# Patient Record
(demographics unavailable — no encounter records)

---

## 2024-10-13 NOTE — HISTORY OF PRESENT ILLNESS
[de-identified] : 53F, RHD, PMHX of Thyroid Disease, Mood Swings, Chronic Back Pain presents with bialteral wrist pain and swelling. Reports has been on going for many years - progressively getting worse. Starting to interfere with ADLs. Denies numbness/tingling in hands. Denies outside imaging/treatment.   1/20/23: f/u bilateral thumb pain. Reports right is doing well after CSI on 12/16/22. Reports left is still having a lot of pain, but injection did help a great deal nonetheless. Denies numbness/tingling.  5/19/23: f/u bilateral thumb pain. Reports using old bowling glove which does help - never purchased metagrip. Admits to having a lot of pain, significantly worse than last time. Denies numbness/tingling   8/29/23: f/u Left wrists. Patient reports to still having a lot of pain. No numbness. NO OT/PT.  10/23/23: f/u left wrist. Patient reports consistent pain in her left wrist, 10/10 with exertion and an 8/10 at rest. Patient reports taking Gabapentin for her symptoms with no relief. Denies numbness/tingling.   11/27/23: s/p left thumb CMC joint arthroplasty with trapeziectomy, left abductor pollicus longus to flexor carpi radialis tendon transfer [11/15/23]. Patient reports intermittent pain on the surgery site associated with discomfort and pressure. Patient has been taking gabapentin and suboxone, reporting some relief. Denies fevers, chills, SOB. Denies numbness/tingling.   01/03/24: s/p left thumb CMC joint arthroplasty with trapeziectomy, left abductor pollicus longus to flexor carpi radialis tendon transfer [11/15/23]. Patient reports that she is doing well. Patient is currently asymptomatic with no acute complaints. Reports some numbness over the left thumb, but reports it is much better than her previous visit.   02/12/24: s/p left thumb CMC joint arthroplasty with trapeziectomy, left abductor pollicus longus to flexor carpi radialis tendon transfer [11/15/23]. Patient reports no pain or discomfort at this time. Patient reports some numbness with palpation, but very minimal. Denies numbness.   03/22/24: f/u bilateral thumbs.  s/p left thumb CMC joint arthroplasty with trapeziectomy, left abductor pollicus longus to flexor carpi radialis tendon transfer [11/15/23]. Patient reports that her right wrist and thumb have been bothering her tremendously, prompting her to return. Patient reports her left thumb is doing well and reports no issues or symptoms. Patient reports her last CSI was in 05/19/23 and provided relief. Patient would like another CSI today.   8/13/24:  Left thumb follow up. Patient reports symptoms have worsened, unable to make a fist and/or grasp. Patient wishes to discuss surgery.  8/20/24: Here to review EMG test results.   09/16/2024: Patient here s/p right wrist proximal row corpectomy, right radial carpal interposition arthroplasty, right wrist posterior interosseous nerve neurectomy, right wrist radial styloidectomy, right ring finger extensor digitorum tendon transfer to middle finger to extensor digitorum communis tendon, right index finger extensor digitorum communis tendon secondary repair with the use of an autograft from extensor indices proprius tendon, right wrist radial extensor tenosynovectomy with transposition of the extensor retinaculum deep to the extensor tendon, and right wrist volar plaster splint application on 09/05/2024. Patient reports she took 2 doses of oxycodone after surgery with no relief. Patient reports she feels the same symptom she had prior to surgery of not being able to lift her fingers of her right hand. Patient reports when she tries to lift her fingers, she experiences pain. Patient denies fever, chills, SOB, CP  10/03/2024: Patient here s/p right wrist proximal row corpectomy, right radial carpal interposition arthroplasty, right wrist posterior interosseous nerve neurectomy, right wrist radial styloidectomy, right ring finger extensor digitorum tendon transfer to middle finger to extensor digitorum communis tendon, right index finger extensor digitorum communis tendon secondary repair with the use of an autograft from extensor indices proprius tendon, right wrist radial extensor tenosynovectomy with transposition of the extensor retinaculum deep to the extensor tendon, and right wrist volar plaster splint application on 09/05/2024. Patient reports she is not in pain at this time. However, patient reports she is still experiencing difficulty with lifting her fingers. Patient reports she is currently in OT 2x a week with only minor improvements. Patient reports she is wearing the wrist brace provided last visit. Patient reports if she does not wear the brace, she experiences numbness in her fingers.  Patient denies taking any medication for pain.

## 2024-10-13 NOTE — HISTORY OF PRESENT ILLNESS
[de-identified] : 53F, RHD, PMHX of Thyroid Disease, Mood Swings, Chronic Back Pain presents with bialteral wrist pain and swelling. Reports has been on going for many years - progressively getting worse. Starting to interfere with ADLs. Denies numbness/tingling in hands. Denies outside imaging/treatment.   1/20/23: f/u bilateral thumb pain. Reports right is doing well after CSI on 12/16/22. Reports left is still having a lot of pain, but injection did help a great deal nonetheless. Denies numbness/tingling.  5/19/23: f/u bilateral thumb pain. Reports using old bowling glove which does help - never purchased metagrip. Admits to having a lot of pain, significantly worse than last time. Denies numbness/tingling   8/29/23: f/u Left wrists. Patient reports to still having a lot of pain. No numbness. NO OT/PT.  10/23/23: f/u left wrist. Patient reports consistent pain in her left wrist, 10/10 with exertion and an 8/10 at rest. Patient reports taking Gabapentin for her symptoms with no relief. Denies numbness/tingling.   11/27/23: s/p left thumb CMC joint arthroplasty with trapeziectomy, left abductor pollicus longus to flexor carpi radialis tendon transfer [11/15/23]. Patient reports intermittent pain on the surgery site associated with discomfort and pressure. Patient has been taking gabapentin and suboxone, reporting some relief. Denies fevers, chills, SOB. Denies numbness/tingling.   01/03/24: s/p left thumb CMC joint arthroplasty with trapeziectomy, left abductor pollicus longus to flexor carpi radialis tendon transfer [11/15/23]. Patient reports that she is doing well. Patient is currently asymptomatic with no acute complaints. Reports some numbness over the left thumb, but reports it is much better than her previous visit.   02/12/24: s/p left thumb CMC joint arthroplasty with trapeziectomy, left abductor pollicus longus to flexor carpi radialis tendon transfer [11/15/23]. Patient reports no pain or discomfort at this time. Patient reports some numbness with palpation, but very minimal. Denies numbness.   03/22/24: f/u bilateral thumbs.  s/p left thumb CMC joint arthroplasty with trapeziectomy, left abductor pollicus longus to flexor carpi radialis tendon transfer [11/15/23]. Patient reports that her right wrist and thumb have been bothering her tremendously, prompting her to return. Patient reports her left thumb is doing well and reports no issues or symptoms. Patient reports her last CSI was in 05/19/23 and provided relief. Patient would like another CSI today.   8/13/24:  Left thumb follow up. Patient reports symptoms have worsened, unable to make a fist and/or grasp. Patient wishes to discuss surgery.  8/20/24: Here to review EMG test results.   09/16/2024: Patient here s/p right wrist proximal row corpectomy, right radial carpal interposition arthroplasty, right wrist posterior interosseous nerve neurectomy, right wrist radial styloidectomy, right ring finger extensor digitorum tendon transfer to middle finger to extensor digitorum communis tendon, right index finger extensor digitorum communis tendon secondary repair with the use of an autograft from extensor indices proprius tendon, right wrist radial extensor tenosynovectomy with transposition of the extensor retinaculum deep to the extensor tendon, and right wrist volar plaster splint application on 09/05/2024. Patient reports she took 2 doses of oxycodone after surgery with no relief. Patient reports she feels the same symptom she had prior to surgery of not being able to lift her fingers of her right hand. Patient reports when she tries to lift her fingers, she experiences pain. Patient denies fever, chills, SOB, CP  10/03/2024: Patient here s/p right wrist proximal row corpectomy, right radial carpal interposition arthroplasty, right wrist posterior interosseous nerve neurectomy, right wrist radial styloidectomy, right ring finger extensor digitorum tendon transfer to middle finger to extensor digitorum communis tendon, right index finger extensor digitorum communis tendon secondary repair with the use of an autograft from extensor indices proprius tendon, right wrist radial extensor tenosynovectomy with transposition of the extensor retinaculum deep to the extensor tendon, and right wrist volar plaster splint application on 09/05/2024. Patient reports she is not in pain at this time. However, patient reports she is still experiencing difficulty with lifting her fingers. Patient reports she is currently in OT 2x a week with only minor improvements. Patient reports she is wearing the wrist brace provided last visit. Patient reports if she does not wear the brace, she experiences numbness in her fingers.  Patient denies taking any medication for pain.

## 2024-10-13 NOTE — IMAGING
[de-identified] : Right hand with no swelling, erythema. Incision well approximated, no erythema nor drainage. Tenodesis intact at index through small, not firing index through ring extensors. Sensation intact throughout. <2sec cap refill. 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 Right wrist MRI with extensor tendons to index and middle finger attritionally ruptured. Lunate fractured chronically, radiocarpal arthrosis.    Left hand with no swelling, erythema. Able to make fist, oppose thumb to small finger with good thenar bulk, no intrinsic atrophy, incision well healed, no erythema nor draiange. Sensation intact throughout. <2sec cap refill.  Left wrist radiographs demonstrate Stage III thumb arthritis.

## 2024-10-13 NOTE — IMAGING
[de-identified] : Right hand with no swelling, erythema. Incision well approximated, no erythema nor drainage. Tenodesis intact at index through small, not firing index through ring extensors. Sensation intact throughout. <2sec cap refill. 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 Right wrist MRI with extensor tendons to index and middle finger attritionally ruptured. Lunate fractured chronically, radiocarpal arthrosis.    Left hand with no swelling, erythema. Able to make fist, oppose thumb to small finger with good thenar bulk, no intrinsic atrophy, incision well healed, no erythema nor draiange. Sensation intact throughout. <2sec cap refill.  Left wrist radiographs demonstrate Stage III thumb arthritis.

## 2024-10-13 NOTE — ASSESSMENT
[FreeTextEntry1] : s/p right wrist proximal row corpectomy, right radial carpal interposition arthroplasty, right wrist posterior interosseous nerve neurectomy, right wrist radial styloidectomy, right ring finger extensor digitorum tendon transfer to middle finger to extensor digitorum communis tendon, right index finger extensor digitorum communis tendon secondary repair with the use of an autograft from extensor indices proprius tendon, right wrist radial extensor tenosynovectomy with transposition of the extensor retinaculum deep to the extensor tendon, and right wrist volar plaster splint application on 09/05/2024    *progressing well postop. OT script for gentle ROM and for wrist brace with MPs extended provided. Brace through 6 weeks postop.   s/p left thumb CMC joint arthroplasty with trapeziectomy, left abductor pollicus longus to flexor carpi radialis tendon transfer [11/15/23] - progressing well postop. WBAT.  F/u 4 weeks to reassess

## 2024-11-23 NOTE — HISTORY OF PRESENT ILLNESS
[de-identified] : 53F, RHD, PMHX of Thyroid Disease, Mood Swings, Chronic Back Pain presents with bialteral wrist pain and swelling. Reports has been on going for many years - progressively getting worse. Starting to interfere with ADLs. Denies numbness/tingling in hands. Denies outside imaging/treatment.   1/20/23: f/u bilateral thumb pain. Reports right is doing well after CSI on 12/16/22. Reports left is still having a lot of pain, but injection did help a great deal nonetheless. Denies numbness/tingling.  5/19/23: f/u bilateral thumb pain. Reports using old bowling glove which does help - never purchased metagrip. Admits to having a lot of pain, significantly worse than last time. Denies numbness/tingling   8/29/23: f/u Left wrists. Patient reports to still having a lot of pain. No numbness. NO OT/PT.  10/23/23: f/u left wrist. Patient reports consistent pain in her left wrist, 10/10 with exertion and an 8/10 at rest. Patient reports taking Gabapentin for her symptoms with no relief. Denies numbness/tingling.   11/27/23: s/p left thumb CMC joint arthroplasty with trapeziectomy, left abductor pollicus longus to flexor carpi radialis tendon transfer [11/15/23]. Patient reports intermittent pain on the surgery site associated with discomfort and pressure. Patient has been taking gabapentin and suboxone, reporting some relief. Denies fevers, chills, SOB. Denies numbness/tingling.   01/03/24: s/p left thumb CMC joint arthroplasty with trapeziectomy, left abductor pollicus longus to flexor carpi radialis tendon transfer [11/15/23]. Patient reports that she is doing well. Patient is currently asymptomatic with no acute complaints. Reports some numbness over the left thumb, but reports it is much better than her previous visit.   02/12/24: s/p left thumb CMC joint arthroplasty with trapeziectomy, left abductor pollicus longus to flexor carpi radialis tendon transfer [11/15/23]. Patient reports no pain or discomfort at this time. Patient reports some numbness with palpation, but very minimal. Denies numbness.   03/22/24: f/u bilateral thumbs.  s/p left thumb CMC joint arthroplasty with trapeziectomy, left abductor pollicus longus to flexor carpi radialis tendon transfer [11/15/23]. Patient reports that her right wrist and thumb have been bothering her tremendously, prompting her to return. Patient reports her left thumb is doing well and reports no issues or symptoms. Patient reports her last CSI was in 05/19/23 and provided relief. Patient would like another CSI today.   8/13/24:  Left thumb follow up. Patient reports symptoms have worsened, unable to make a fist and/or grasp. Patient wishes to discuss surgery.  8/20/24: Here to review EMG test results.   09/16/2024: Patient here s/p right wrist proximal row corpectomy, right radial carpal interposition arthroplasty, right wrist posterior interosseous nerve neurectomy, right wrist radial styloidectomy, right ring finger extensor digitorum tendon transfer to middle finger to extensor digitorum communis tendon, right index finger extensor digitorum communis tendon secondary repair with the use of an autograft from extensor indices proprius tendon, right wrist radial extensor tenosynovectomy with transposition of the extensor retinaculum deep to the extensor tendon, and right wrist volar plaster splint application on 09/05/2024. Patient reports she took 2 doses of oxycodone after surgery with no relief. Patient reports she feels the same symptom she had prior to surgery of not being able to lift her fingers of her right hand. Patient reports when she tries to lift her fingers, she experiences pain. Patient denies fever, chills, SOB, CP  10/03/2024: Patient here s/p right wrist proximal row corpectomy, right radial carpal interposition arthroplasty, right wrist posterior interosseous nerve neurectomy, right wrist radial styloidectomy, right ring finger extensor digitorum tendon transfer to middle finger to extensor digitorum communis tendon, right index finger extensor digitorum communis tendon secondary repair with the use of an autograft from extensor indices proprius tendon, right wrist radial extensor tenosynovectomy with transposition of the extensor retinaculum deep to the extensor tendon, and right wrist volar plaster splint application on 09/05/2024. Patient reports she is not in pain at this time. However, patient reports she is still experiencing difficulty with lifting her fingers. Patient reports she is currently in OT 2x a week with only minor improvements. Patient reports she is wearing the wrist brace provided last visit. Patient reports if she does not wear the brace, she experiences numbness in her fingers.  Patient denies taking any medication for pain.   11/18/24:  s/p right wrist proximal row corpectomy, right radial carpal interposition arthroplasty, right wrist posterior interosseous nerve neurectomy, right wrist radial styloidectomy, right ring finger extensor digitorum tendon transfer to middle finger to extensor digitorum communis tendon, right index finger extensor digitorum communis tendon secondary repair with the use of an autograft from extensor indices proprius tendon, right wrist radial extensor tenosynovectomy with transposition of the extensor retinaculum deep to the extensor tendon, and right wrist volar plaster splint application on [09/05/24]. Patient reports that she is constant pain with her right wrist, waxing and waning in intensity. Pain aggravated with exertion and usage. Patient reports that she is attending OT 2x a week with no relief. Patientis currently taking gabapentin with no relief.

## 2024-11-23 NOTE — ASSESSMENT
[FreeTextEntry1] : s/p right wrist proximal row corpectomy, right radial carpal interposition arthroplasty, right wrist posterior interosseous nerve neurectomy, right wrist radial styloidectomy, right ring finger extensor digitorum tendon transfer to middle finger to extensor digitorum communis tendon, right index finger extensor digitorum communis tendon secondary repair with the use of an autograft from extensor indices proprius tendon, right wrist radial extensor tenosynovectomy with transposition of the extensor retinaculum deep to the extensor tendon, and right wrist volar plaster splint application on 09/05/2024    *progressing well postop. WBAT, continue OT, brace for comfort.   s/p left thumb CMC joint arthroplasty with trapeziectomy, left abductor pollicus longus to flexor carpi radialis tendon transfer [11/15/23] - progressing well postop. WBAT.  F/u 4 weeks to reassess

## 2024-11-23 NOTE — HISTORY OF PRESENT ILLNESS
[de-identified] : 53F, RHD, PMHX of Thyroid Disease, Mood Swings, Chronic Back Pain presents with bialteral wrist pain and swelling. Reports has been on going for many years - progressively getting worse. Starting to interfere with ADLs. Denies numbness/tingling in hands. Denies outside imaging/treatment.   1/20/23: f/u bilateral thumb pain. Reports right is doing well after CSI on 12/16/22. Reports left is still having a lot of pain, but injection did help a great deal nonetheless. Denies numbness/tingling.  5/19/23: f/u bilateral thumb pain. Reports using old bowling glove which does help - never purchased metagrip. Admits to having a lot of pain, significantly worse than last time. Denies numbness/tingling   8/29/23: f/u Left wrists. Patient reports to still having a lot of pain. No numbness. NO OT/PT.  10/23/23: f/u left wrist. Patient reports consistent pain in her left wrist, 10/10 with exertion and an 8/10 at rest. Patient reports taking Gabapentin for her symptoms with no relief. Denies numbness/tingling.   11/27/23: s/p left thumb CMC joint arthroplasty with trapeziectomy, left abductor pollicus longus to flexor carpi radialis tendon transfer [11/15/23]. Patient reports intermittent pain on the surgery site associated with discomfort and pressure. Patient has been taking gabapentin and suboxone, reporting some relief. Denies fevers, chills, SOB. Denies numbness/tingling.   01/03/24: s/p left thumb CMC joint arthroplasty with trapeziectomy, left abductor pollicus longus to flexor carpi radialis tendon transfer [11/15/23]. Patient reports that she is doing well. Patient is currently asymptomatic with no acute complaints. Reports some numbness over the left thumb, but reports it is much better than her previous visit.   02/12/24: s/p left thumb CMC joint arthroplasty with trapeziectomy, left abductor pollicus longus to flexor carpi radialis tendon transfer [11/15/23]. Patient reports no pain or discomfort at this time. Patient reports some numbness with palpation, but very minimal. Denies numbness.   03/22/24: f/u bilateral thumbs.  s/p left thumb CMC joint arthroplasty with trapeziectomy, left abductor pollicus longus to flexor carpi radialis tendon transfer [11/15/23]. Patient reports that her right wrist and thumb have been bothering her tremendously, prompting her to return. Patient reports her left thumb is doing well and reports no issues or symptoms. Patient reports her last CSI was in 05/19/23 and provided relief. Patient would like another CSI today.   8/13/24:  Left thumb follow up. Patient reports symptoms have worsened, unable to make a fist and/or grasp. Patient wishes to discuss surgery.  8/20/24: Here to review EMG test results.   09/16/2024: Patient here s/p right wrist proximal row corpectomy, right radial carpal interposition arthroplasty, right wrist posterior interosseous nerve neurectomy, right wrist radial styloidectomy, right ring finger extensor digitorum tendon transfer to middle finger to extensor digitorum communis tendon, right index finger extensor digitorum communis tendon secondary repair with the use of an autograft from extensor indices proprius tendon, right wrist radial extensor tenosynovectomy with transposition of the extensor retinaculum deep to the extensor tendon, and right wrist volar plaster splint application on 09/05/2024. Patient reports she took 2 doses of oxycodone after surgery with no relief. Patient reports she feels the same symptom she had prior to surgery of not being able to lift her fingers of her right hand. Patient reports when she tries to lift her fingers, she experiences pain. Patient denies fever, chills, SOB, CP  10/03/2024: Patient here s/p right wrist proximal row corpectomy, right radial carpal interposition arthroplasty, right wrist posterior interosseous nerve neurectomy, right wrist radial styloidectomy, right ring finger extensor digitorum tendon transfer to middle finger to extensor digitorum communis tendon, right index finger extensor digitorum communis tendon secondary repair with the use of an autograft from extensor indices proprius tendon, right wrist radial extensor tenosynovectomy with transposition of the extensor retinaculum deep to the extensor tendon, and right wrist volar plaster splint application on 09/05/2024. Patient reports she is not in pain at this time. However, patient reports she is still experiencing difficulty with lifting her fingers. Patient reports she is currently in OT 2x a week with only minor improvements. Patient reports she is wearing the wrist brace provided last visit. Patient reports if she does not wear the brace, she experiences numbness in her fingers.  Patient denies taking any medication for pain.   11/18/24:  s/p right wrist proximal row corpectomy, right radial carpal interposition arthroplasty, right wrist posterior interosseous nerve neurectomy, right wrist radial styloidectomy, right ring finger extensor digitorum tendon transfer to middle finger to extensor digitorum communis tendon, right index finger extensor digitorum communis tendon secondary repair with the use of an autograft from extensor indices proprius tendon, right wrist radial extensor tenosynovectomy with transposition of the extensor retinaculum deep to the extensor tendon, and right wrist volar plaster splint application on [09/05/24]. Patient reports that she is constant pain with her right wrist, waxing and waning in intensity. Pain aggravated with exertion and usage. Patient reports that she is attending OT 2x a week with no relief. Patientis currently taking gabapentin with no relief.

## 2024-11-23 NOTE — IMAGING
[de-identified] : Right hand with no swelling, erythema. Incision well approximated, no erythema nor drainage. Able to extend all digtis. Sensation intact throughout. <2sec cap refill. 20 degree lag at index and middle  Right wrist radiographs demonstrate PRC, well aligned wrist. Right wrist MRI with extensor tendons to index and middle finger attritionally ruptured. Lunate fractured chronically, radiocarpal arthrosis.    Left hand with no swelling, erythema. Able to make fist, oppose thumb to small finger with good thenar bulk, no intrinsic atrophy, incision well healed, no erythema nor draiange. Sensation intact throughout. <2sec cap refill.  Left wrist radiographs demonstrate Stage III thumb arthritis.

## 2024-11-23 NOTE — IMAGING
[de-identified] : Right hand with no swelling, erythema. Incision well approximated, no erythema nor drainage. Able to extend all digtis. Sensation intact throughout. <2sec cap refill. 20 degree lag at index and middle  Right wrist radiographs demonstrate PRC, well aligned wrist. Right wrist MRI with extensor tendons to index and middle finger attritionally ruptured. Lunate fractured chronically, radiocarpal arthrosis.    Left hand with no swelling, erythema. Able to make fist, oppose thumb to small finger with good thenar bulk, no intrinsic atrophy, incision well healed, no erythema nor draiange. Sensation intact throughout. <2sec cap refill.  Left wrist radiographs demonstrate Stage III thumb arthritis.

## 2025-01-28 NOTE — ASSESSMENT
[FreeTextEntry1] : s/p right wrist proximal row corpectomy, right radial carpal interposition arthroplasty, right wrist posterior interosseous nerve neurectomy, right wrist radial styloidectomy, right ring finger extensor digitorum tendon transfer to middle finger to extensor digitorum communis tendon, right index finger extensor digitorum communis tendon secondary repair with the use of an autograft from extensor indices proprius tendon, right wrist radial extensor tenosynovectomy with transposition of the extensor retinaculum deep to the extensor tendon, and right wrist volar plaster splint application on 09/05/2024  In light of significant postoperative pain, will obtain right wrist MRI without contrast and will follow-up thereafter to discuss results and develop plan of care.  s/p left thumb CMC joint arthroplasty with trapeziectomy, left abductor pollicus longus to flexor carpi radialis tendon transfer [11/15/23] - progressing well postop. WBAT.  F/u after MRI

## 2025-01-28 NOTE — HISTORY OF PRESENT ILLNESS
[de-identified] : 53F, RHD, PMHX of Thyroid Disease, Mood Swings, Chronic Back Pain presents with bialteral wrist pain and swelling. Reports has been on going for many years - progressively getting worse. Starting to interfere with ADLs. Denies numbness/tingling in hands. Denies outside imaging/treatment.   1/20/23: f/u bilateral thumb pain. Reports right is doing well after CSI on 12/16/22. Reports left is still having a lot of pain, but injection did help a great deal nonetheless. Denies numbness/tingling.  5/19/23: f/u bilateral thumb pain. Reports using old bowling glove which does help - never purchased metagrip. Admits to having a lot of pain, significantly worse than last time. Denies numbness/tingling   8/29/23: f/u Left wrists. Patient reports to still having a lot of pain. No numbness. NO OT/PT.  10/23/23: f/u left wrist. Patient reports consistent pain in her left wrist, 10/10 with exertion and an 8/10 at rest. Patient reports taking Gabapentin for her symptoms with no relief. Denies numbness/tingling.   11/27/23: s/p left thumb CMC joint arthroplasty with trapeziectomy, left abductor pollicus longus to flexor carpi radialis tendon transfer [11/15/23]. Patient reports intermittent pain on the surgery site associated with discomfort and pressure. Patient has been taking gabapentin and suboxone, reporting some relief. Denies fevers, chills, SOB. Denies numbness/tingling.   01/03/24: s/p left thumb CMC joint arthroplasty with trapeziectomy, left abductor pollicus longus to flexor carpi radialis tendon transfer [11/15/23]. Patient reports that she is doing well. Patient is currently asymptomatic with no acute complaints. Reports some numbness over the left thumb, but reports it is much better than her previous visit.   02/12/24: s/p left thumb CMC joint arthroplasty with trapeziectomy, left abductor pollicus longus to flexor carpi radialis tendon transfer [11/15/23]. Patient reports no pain or discomfort at this time. Patient reports some numbness with palpation, but very minimal. Denies numbness.   03/22/24: f/u bilateral thumbs.  s/p left thumb CMC joint arthroplasty with trapeziectomy, left abductor pollicus longus to flexor carpi radialis tendon transfer [11/15/23]. Patient reports that her right wrist and thumb have been bothering her tremendously, prompting her to return. Patient reports her left thumb is doing well and reports no issues or symptoms. Patient reports her last CSI was in 05/19/23 and provided relief. Patient would like another CSI today.   8/13/24:  Left thumb follow up. Patient reports symptoms have worsened, unable to make a fist and/or grasp. Patient wishes to discuss surgery.  8/20/24: Here to review EMG test results.   09/16/2024: Patient here s/p right wrist proximal row corpectomy, right radial carpal interposition arthroplasty, right wrist posterior interosseous nerve neurectomy, right wrist radial styloidectomy, right ring finger extensor digitorum tendon transfer to middle finger to extensor digitorum communis tendon, right index finger extensor digitorum communis tendon secondary repair with the use of an autograft from extensor indices proprius tendon, right wrist radial extensor tenosynovectomy with transposition of the extensor retinaculum deep to the extensor tendon, and right wrist volar plaster splint application on 09/05/2024. Patient reports she took 2 doses of oxycodone after surgery with no relief. Patient reports she feels the same symptom she had prior to surgery of not being able to lift her fingers of her right hand. Patient reports when she tries to lift her fingers, she experiences pain. Patient denies fever, chills, SOB, CP  10/03/2024: Patient here s/p right wrist proximal row corpectomy, right radial carpal interposition arthroplasty, right wrist posterior interosseous nerve neurectomy, right wrist radial styloidectomy, right ring finger extensor digitorum tendon transfer to middle finger to extensor digitorum communis tendon, right index finger extensor digitorum communis tendon secondary repair with the use of an autograft from extensor indices proprius tendon, right wrist radial extensor tenosynovectomy with transposition of the extensor retinaculum deep to the extensor tendon, and right wrist volar plaster splint application on 09/05/2024. Patient reports she is not in pain at this time. However, patient reports she is still experiencing difficulty with lifting her fingers. Patient reports she is currently in OT 2x a week with only minor improvements. Patient reports she is wearing the wrist brace provided last visit. Patient reports if she does not wear the brace, she experiences numbness in her fingers.  Patient denies taking any medication for pain.   11/18/24:  s/p right wrist proximal row corpectomy, right radial carpal interposition arthroplasty, right wrist posterior interosseous nerve neurectomy, right wrist radial styloidectomy, right ring finger extensor digitorum tendon transfer to middle finger to extensor digitorum communis tendon, right index finger extensor digitorum communis tendon secondary repair with the use of an autograft from extensor indices proprius tendon, right wrist radial extensor tenosynovectomy with transposition of the extensor retinaculum deep to the extensor tendon, and right wrist volar plaster splint application on [09/05/24]. Patient reports that she is constant pain with her right wrist, waxing and waning in intensity. Pain aggravated with exertion and usage. Patient reports that she is attending OT 2x a week with no relief. Patientis currently taking gabapentin with no relief.   1/27/25: s/p right wrist proximal row corpectomy, right radial carpal interposition arthroplasty, right wrist posterior interosseous nerve neurectomy, right wrist radial styloidectomy, right ring finger extensor digitorum tendon transfer to middle finger to extensor digitorum communis tendon, right index finger extensor digitorum communis tendon secondary repair with the use of an autograft from extensor indices proprius tendon, right wrist radial extensor tenosynovectomy with transposition of the extensor retinaculum deep to the extensor tendon, and right wrist volar plaster splint application on [09/05/24]. Patient reports that she has constant significant pain at this time, with significant pain at the palmar surface of the hand and also in the dorsal aspect of the wrist. Patient reports continued use of gabapentin with no relief. Patient has tried THC Gummies and reports mild relief from these. Patient reports that she has not been attending OT since last visit. Patient reports occasional use of brace and ace wrap with some mild relief.

## 2025-01-28 NOTE — IMAGING
[de-identified] : Right hand with no swelling, erythema. Incision well approximated, no erythema nor drainage. Able to extend all digits. Sensation intact throughout. <2sec cap refill. 10 degree lag at index and minimal lag at middle  Right wrist radiographs demonstrate PRC, well aligned wrist.  Right wrist MRI with extensor tendons to index and middle finger attritionally ruptured. Lunate fractured chronically, radiocarpal arthrosis.    Left hand with no swelling, erythema. Able to make fist, oppose thumb to small finger with good thenar bulk, no intrinsic atrophy, incision well healed, no erythema nor draiange. Sensation intact throughout. <2sec cap refill.  Left wrist radiographs demonstrate Stage III thumb arthritis.

## 2025-02-25 NOTE — IMAGING
[de-identified] : Right hand with no swelling, erythema. Incision well approximated, no erythema nor drainage. Able to extend all digits. Sensation intact throughout. <2sec cap refill. 10 degree lag at index and minimal lag at middle  Right wrist radiographs demonstrate PRC, well aligned wrist.  Right wrist MRI with extensor tendons to index and middle finger attritionally ruptured. Lunate fractured chronically, radiocarpal arthrosis. Right wrist MRI [2-6-25] - edema through distal carpal row, distal radius through metaphysis and 2nd metacarpal. PRC.    Left hand with no swelling, erythema. Able to make fist, oppose thumb to small finger with good thenar bulk, no intrinsic atrophy, incision well healed, no erythema nor draiange. Sensation intact throughout. <2sec cap refill.  Left wrist radiographs demonstrate Stage III thumb arthritis.

## 2025-02-25 NOTE — HISTORY OF PRESENT ILLNESS
[de-identified] : 53F, RHD, PMHX of Thyroid Disease, Mood Swings, Chronic Back Pain presents with bialteral wrist pain and swelling. Reports has been on going for many years - progressively getting worse. Starting to interfere with ADLs. Denies numbness/tingling in hands. Denies outside imaging/treatment.   1/20/23: f/u bilateral thumb pain. Reports right is doing well after CSI on 12/16/22. Reports left is still having a lot of pain, but injection did help a great deal nonetheless. Denies numbness/tingling.  5/19/23: f/u bilateral thumb pain. Reports using old bowling glove which does help - never purchased metagrip. Admits to having a lot of pain, significantly worse than last time. Denies numbness/tingling   8/29/23: f/u Left wrists. Patient reports to still having a lot of pain. No numbness. NO OT/PT.  10/23/23: f/u left wrist. Patient reports consistent pain in her left wrist, 10/10 with exertion and an 8/10 at rest. Patient reports taking Gabapentin for her symptoms with no relief. Denies numbness/tingling.   11/27/23: s/p left thumb CMC joint arthroplasty with trapeziectomy, left abductor pollicus longus to flexor carpi radialis tendon transfer [11/15/23]. Patient reports intermittent pain on the surgery site associated with discomfort and pressure. Patient has been taking gabapentin and suboxone, reporting some relief. Denies fevers, chills, SOB. Denies numbness/tingling.   01/03/24: s/p left thumb CMC joint arthroplasty with trapeziectomy, left abductor pollicus longus to flexor carpi radialis tendon transfer [11/15/23]. Patient reports that she is doing well. Patient is currently asymptomatic with no acute complaints. Reports some numbness over the left thumb, but reports it is much better than her previous visit.   02/12/24: s/p left thumb CMC joint arthroplasty with trapeziectomy, left abductor pollicus longus to flexor carpi radialis tendon transfer [11/15/23]. Patient reports no pain or discomfort at this time. Patient reports some numbness with palpation, but very minimal. Denies numbness.   03/22/24: f/u bilateral thumbs.  s/p left thumb CMC joint arthroplasty with trapeziectomy, left abductor pollicus longus to flexor carpi radialis tendon transfer [11/15/23]. Patient reports that her right wrist and thumb have been bothering her tremendously, prompting her to return. Patient reports her left thumb is doing well and reports no issues or symptoms. Patient reports her last CSI was in 05/19/23 and provided relief. Patient would like another CSI today.   8/13/24:  Left thumb follow up. Patient reports symptoms have worsened, unable to make a fist and/or grasp. Patient wishes to discuss surgery.  8/20/24: Here to review EMG test results.   09/16/2024: Patient here s/p right wrist proximal row corpectomy, right radial carpal interposition arthroplasty, right wrist posterior interosseous nerve neurectomy, right wrist radial styloidectomy, right ring finger extensor digitorum tendon transfer to middle finger to extensor digitorum communis tendon, right index finger extensor digitorum communis tendon secondary repair with the use of an autograft from extensor indices proprius tendon, right wrist radial extensor tenosynovectomy with transposition of the extensor retinaculum deep to the extensor tendon, and right wrist volar plaster splint application on 09/05/2024. Patient reports she took 2 doses of oxycodone after surgery with no relief. Patient reports she feels the same symptom she had prior to surgery of not being able to lift her fingers of her right hand. Patient reports when she tries to lift her fingers, she experiences pain. Patient denies fever, chills, SOB, CP  10/03/2024: Patient here s/p right wrist proximal row corpectomy, right radial carpal interposition arthroplasty, right wrist posterior interosseous nerve neurectomy, right wrist radial styloidectomy, right ring finger extensor digitorum tendon transfer to middle finger to extensor digitorum communis tendon, right index finger extensor digitorum communis tendon secondary repair with the use of an autograft from extensor indices proprius tendon, right wrist radial extensor tenosynovectomy with transposition of the extensor retinaculum deep to the extensor tendon, and right wrist volar plaster splint application on 09/05/2024. Patient reports she is not in pain at this time. However, patient reports she is still experiencing difficulty with lifting her fingers. Patient reports she is currently in OT 2x a week with only minor improvements. Patient reports she is wearing the wrist brace provided last visit. Patient reports if she does not wear the brace, she experiences numbness in her fingers.  Patient denies taking any medication for pain.   11/18/24:  s/p right wrist proximal row corpectomy, right radial carpal interposition arthroplasty, right wrist posterior interosseous nerve neurectomy, right wrist radial styloidectomy, right ring finger extensor digitorum tendon transfer to middle finger to extensor digitorum communis tendon, right index finger extensor digitorum communis tendon secondary repair with the use of an autograft from extensor indices proprius tendon, right wrist radial extensor tenosynovectomy with transposition of the extensor retinaculum deep to the extensor tendon, and right wrist volar plaster splint application on [09/05/24]. Patient reports that she is constant pain with her right wrist, waxing and waning in intensity. Pain aggravated with exertion and usage. Patient reports that she is attending OT 2x a week with no relief. Patientis currently taking gabapentin with no relief.   1/27/25: s/p right wrist proximal row corpectomy, right radial carpal interposition arthroplasty, right wrist posterior interosseous nerve neurectomy, right wrist radial styloidectomy, right ring finger extensor digitorum tendon transfer to middle finger to extensor digitorum communis tendon, right index finger extensor digitorum communis tendon secondary repair with the use of an autograft from extensor indices proprius tendon, right wrist radial extensor tenosynovectomy with transposition of the extensor retinaculum deep to the extensor tendon, and right wrist volar plaster splint application on [09/05/24]. Patient reports that she has constant significant pain at this time, with significant pain at the palmar surface of the hand and also in the dorsal aspect of the wrist. Patient reports continued use of gabapentin with no relief. Patient has tried THC Gummies and reports mild relief from these. Patient reports that she has not been attending OT since last visit. Patient reports occasional use of brace and ace wrap with some mild relief.  2/24/25:  s/p right wrist proximal row corpectomy, right radial carpal interposition arthroplasty, right wrist posterior interosseous nerve neurectomy, right wrist radial styloidectomy, right ring finger extensor digitorum tendon transfer to middle finger to extensor digitorum communis tendon, right index finger extensor digitorum communis tendon secondary repair with the use of an autograft from extensor indices proprius tendon, right wrist radial extensor tenosynovectomy with transposition of the extensor retinaculum deep to the extensor tendon, and right wrist volar plaster splint application on [09/05/24]. Patient is here for MRI Results review, completed at

## 2025-02-25 NOTE — ASSESSMENT
[FreeTextEntry1] : s/p right wrist proximal row corpectomy, right radial carpal interposition arthroplasty, right wrist posterior interosseous nerve neurectomy, right wrist radial styloidectomy, right ring finger extensor digitorum tendon transfer to middle finger to extensor digitorum communis tendon, right index finger extensor digitorum communis tendon secondary repair with the use of an autograft from extensor indices proprius tendon, right wrist radial extensor tenosynovectomy with transposition of the extensor retinaculum deep to the extensor tendon, and right wrist volar plaster splint application on 09/05/2024  Reviewed MRI , discussed recommendation to follow-up with rheum for consultation in light of bilateral wrist arthrosis and pancarpus inflammation. Discussed no signs of infection. Discussed patient's often heightened experience of pain for bilateral wrist pre and post surgery.  s/p left thumb CMC joint arthroplasty with trapeziectomy, left abductor pollicus longus to flexor carpi radialis tendon transfer [11/15/23] - progressing well postop. WBAT.  F/u with rheum and 2-3mo (consider CSI)